# Patient Record
(demographics unavailable — no encounter records)

---

## 2017-12-02 NOTE — EDM.PDOC
ED HPI GENERAL MEDICAL PROBLEM





- General


Chief Complaint: Lower Extremity Injury/Pain


Stated Complaint: LEFT KNEE PAIN


Time Seen by Provider: 12/02/17 11:47


Source of Information: Reports: Patient


History Limitations: Reports: No Limitations





- History of Present Illness


INITIAL COMMENTS - FREE TEXT/NARRATIVE: 


History of present illness:


[]Patient slipped yesterday at 6 PM with her right knee going underneath her. 

She has pain all over her knee but it feels unstable when she walks. She denies 

any other injuries. She tried to go to the orthopedic clinic but was told she 

has to be seen to the ER first.





Review of systems: 


As per history of present illness and below otherwise all systems reviewed and 

negative.





Past medical history: 


As per history of present illness and as reviewed below otherwise 

noncontributory.





Surgical history: 


As per history of present illness and as reviewed below otherwise 

noncontributory.





Social history: 


No reported history of drug or alcohol abuse.





Family history: 


As per history of present illness and as reviewed below otherwise 

noncontributory.





Physical exam:


General: Well developed, well nourished in NAD


HEENT: Atraumatic, normocephalic, pupils reactive, negative for conjunctival 

pallor or scleral icterus, mucous membranes moist, throat clear, neck supple, 

nontender, trachea midline.


Lungs: Clear to auscultation, breath sounds equal bilaterally, chest nontender.


Heart: S1S2, regular, negative for clicks, rubs, or JVD.


Abdomen: Soft, nondistended, nontender. Negative for masses or 

hepatosplenomegaly. Negative for costovertebral tenderness.


Pelvis: Stable nontender.


Genitourinary: Deferred.


Rectal: Deferred.


Extremities: Atraumatic, right knee tender to palpation no effusion stable on 

exam negative for cords or calf pain. Neurovascular unremarkable.


Neuro: Awake, alert, oriented. Cranial nerves II through XII unremarkable. 

Cerebellum unremarkable. Motor and sensory unremarkable throughout. Exam 

nonfocal.





Diagnostics:


[]X-ray negative for fracture





Therapeutics:


[]





Impression: 


[]Right knee sprain





Plan:


[]Knee immobilizer for stability follow-up with orthopedics Motrin for pain 

continue to ice knee.





Definitive disposition and diagnosis as appropriate pending reevaluation and 

review of above.





  ** Right Knee


Pain Score (Numeric/FACES): 4





- Related Data


 Allergies











Allergy/AdvReac Type Severity Reaction Status Date / Time


 


aspirin Allergy  Tachycardia Verified 12/02/17 11:51











Home Meds: 


 Home Meds





. [No Known Home Meds]  12/02/17 [History]











Past Medical History





- Past Health History


Medical/Surgical History: Denies Medical/Surgical History


HEENT History: Reports: None


Cardiovascular History: Reports: None


Respiratory History: Reports: None


Gastrointestinal History: Reports: Chronic Diarrhea, GERD


Genitourinary History: Reports: None


OB/GYN History: Reports: None


Musculoskeletal History: Reports: None


Neurological History: Reports: None


Psychiatric History: Reports: None


Endocrine/Metabolic History: Reports: Obesity/BMI 30+


Hematologic History: Reports: None


Immunologic History: Reports: None


Oncologic (Cancer) History: Reports: None


Dermatologic History: Reports: None





- Infectious Disease History


Infectious Disease History: Reports: Chicken Pox, Hepatitis C, Measles





- Past Surgical History


Head Surgeries/Procedures: Reports: None


GI Surgical History: Reports: Cholecystectomy





Social & Family History





- Family History


Family Medical History: Noncontributory





- Tobacco Use


Smoking Status *Q: Former Smoker


Years of Tobacco use: 10


Packs/Tins Daily: 0.2


Used Tobacco, but Quit: Yes


Month Tobacco Last Used: September


Second Hand Smoke Exposure: Yes





- Caffeine Use


Caffeine Use: Reports: None





- Alcohol Use


Days Per Week of Alcohol Use: 2


Number of Drinks Per Day: 4


Total Drinks Per Week: 8





- Recreational Drug Use


Recreational Drug Use: No


Drug Use in Last 12 Months: No





Review of Systems





- Review of Systems


Review Of Systems: See Below (See history of present illness)





ED EXAM, GENERAL





- Physical Exam


Exam: See Below (See history of present illness)





Course





- Vital Signs


Last Recorded V/S: 


 Last Vital Signs











Temp  97.5 F   12/02/17 11:48


 


Pulse  76   12/02/17 11:48


 


Resp  18   12/02/17 11:48


 


BP  138/63   12/02/17 11:48


 


Pulse Ox  97   12/02/17 11:48














- Orders/Labs/Meds


Orders: 


 Active Orders 24 hr











 Category Date Time Status


 


 Knee 3V Rt [CR] Stat Exams  12/02/17 12:00 Taken














Departure





- Departure


Time of Disposition: 12:52


Disposition: Home, Self-Care 01


Condition: Good


Clinical Impression: 


Right knee sprain


Qualifiers:


 Encounter type: initial encounter Involved ligament of knee: unspecified 

ligament Qualified Code(s): S83.91XA - Sprain of unspecified site of right knee

, initial encounter








- Discharge Information


Referrals: 


PCP,None [Primary Care Provider] - 


Forms:  ED Department Discharge


Additional Instructions: 


The following information is given to patients seen in the emergency department 

who are being discharged to home. This information is to outline your options 

for follow-up care. We provide all patients seen in our emergency department 

with a follow-up referral.





The need for follow-up, as well as the timing and circumstances, are variable 

depending upon the specifics of your emergency department visit.





If you don't have a primary care physician on staff, we will provide you with a 

referral. We always advise you to contact your personal physician following an 

emergency department visit to inform them of the circumstance of the visit and 

for follow-up with them and/or the need for any referrals to a consulting 

specialist.





The emergency department will also refer you to a specialist when appropriate. 

This referral assures that you have the opportunity for follow-up care with a 

specialist. All of these measure are taken in an effort to provide you with 

optimal care, which includes your follow-up.





Under all circumstances we always encourage you to contact your private 

physician who remains a resource for coordinating your care. When calling for 

follow-up care, please make the office aware that this follow-up is from your 

recent emergency room visit. If for any reason you are refused follow-up, 

please contact the [facility] Emergency 

Department at [phone] and asked to speak to the emergency department 

charge nurse.





Motrin for pain immobilizer as needed when walking follow-up with orthopedics





[facility]


Primary Care


[address] [place] [id number]


Phone: [phone]


Fax: [phone]





- My Orders


Last 24 Hours: 


My Active Orders





12/02/17 12:00


Knee 3V Rt [CR] Stat 














- Assessment/Plan


Last 24 Hours: 


My Active Orders





12/02/17 12:00


Knee 3V Rt [CR] Stat

## 2017-12-04 NOTE — CR
EXAM DATE: 17



PATIENT'S AGE: 32





Patient: WALE ORLANDO



Facility: [place], [place]

Patient ID: [id number]

Site Patient ID: [id number].

Site Accession #: [id number].

: 1985

Study: XRay Knee [id number]/2/2017 12:21:11 PM

Ordering Physician: Max Bender



Final Report: 

INDICATION: fall



TECHNIQUE:

Three views of the right knee are submitted.



COMPARISON:

None.



FINDINGS:

Joint spaces of the right knee are preserved. No evidence for fracture, 
dislocation or knee effusion.



IMPRESSION:

Negative right knee.



Dictated by Edwardo Jauregui MD @ 2017 12:54:16 PM





Dictated by: Edwardo Jauregui MD @ 2017 12:54:22

(Electronic Signature)





Report Signed by Proxy.
[facility]